# Patient Record
Sex: FEMALE | Race: WHITE | NOT HISPANIC OR LATINO | URBAN - METROPOLITAN AREA
[De-identification: names, ages, dates, MRNs, and addresses within clinical notes are randomized per-mention and may not be internally consistent; named-entity substitution may affect disease eponyms.]

---

## 2017-06-14 ENCOUNTER — OFFICE VISIT - RIVER FALLS (OUTPATIENT)
Dept: FAMILY MEDICINE | Facility: CLINIC | Age: 82
End: 2017-06-14

## 2017-07-25 ENCOUNTER — OFFICE VISIT - RIVER FALLS (OUTPATIENT)
Dept: FAMILY MEDICINE | Facility: CLINIC | Age: 82
End: 2017-07-25

## 2017-09-28 ENCOUNTER — OFFICE VISIT - RIVER FALLS (OUTPATIENT)
Dept: FAMILY MEDICINE | Facility: CLINIC | Age: 82
End: 2017-09-28

## 2017-11-29 ENCOUNTER — OFFICE VISIT - RIVER FALLS (OUTPATIENT)
Dept: FAMILY MEDICINE | Facility: CLINIC | Age: 82
End: 2017-11-29

## 2018-01-31 ENCOUNTER — OFFICE VISIT - RIVER FALLS (OUTPATIENT)
Dept: FAMILY MEDICINE | Facility: CLINIC | Age: 83
End: 2018-01-31

## 2018-03-28 ENCOUNTER — OFFICE VISIT - RIVER FALLS (OUTPATIENT)
Dept: FAMILY MEDICINE | Facility: CLINIC | Age: 83
End: 2018-03-28

## 2018-05-23 ENCOUNTER — OFFICE VISIT - RIVER FALLS (OUTPATIENT)
Dept: FAMILY MEDICINE | Facility: CLINIC | Age: 83
End: 2018-05-23

## 2018-07-31 ENCOUNTER — OFFICE VISIT - RIVER FALLS (OUTPATIENT)
Dept: FAMILY MEDICINE | Facility: CLINIC | Age: 83
End: 2018-07-31

## 2018-08-29 ENCOUNTER — OFFICE VISIT - RIVER FALLS (OUTPATIENT)
Dept: FAMILY MEDICINE | Facility: CLINIC | Age: 83
End: 2018-08-29

## 2018-09-26 ENCOUNTER — OFFICE VISIT - RIVER FALLS (OUTPATIENT)
Dept: FAMILY MEDICINE | Facility: CLINIC | Age: 83
End: 2018-09-26

## 2018-11-28 ENCOUNTER — OFFICE VISIT - RIVER FALLS (OUTPATIENT)
Dept: FAMILY MEDICINE | Facility: CLINIC | Age: 83
End: 2018-11-28

## 2019-01-01 ENCOUNTER — OFFICE VISIT - RIVER FALLS (OUTPATIENT)
Dept: FAMILY MEDICINE | Facility: CLINIC | Age: 84
End: 2019-01-01

## 2021-06-02 ENCOUNTER — RECORDS - HEALTHEAST (OUTPATIENT)
Dept: ADMINISTRATIVE | Facility: CLINIC | Age: 86
End: 2021-06-02

## 2022-02-15 NOTE — PROGRESS NOTES
NIRMALA TODD :   1922 MRN:   [id number]  [facility] Visit  S: The resident wants to decrease the frequency of her blood sugar checks.  She has otherwise been stable.   O: Blood pressure is 120/72.  Pulse is 85.  Weight is 126 and stable.  She is alert and comfortable.  Skin is normal color, temperature, and non-diaphoretic.  Lungs are clear bilaterally.  Normal respiratory rate and effort.  Heart is regular rate and rhythm without murmur, S3 or S4.  No jugular venous distension or carotid bruits.  No peripheral cyanosis or edema.  A: Diabetes.   P: Discontinue Phenergan and Imodium as well as Artificial Tears due to nonuse.  Increase Lantus to 20 units q.h.s.   D:  2017  T:  2017 Rehan Sanders MD/sq    c:  [facility]

## 2022-02-15 NOTE — PROGRESS NOTES
NIRMALA TODD  : 1922  MRN: [id number]  [facility] Visit  S: Primary medical problems are diabetes and mild dementia.  Nursing staff reports no current problems.    O: Blood pressure is 97/67.  Pulse is 103.  Weight is 131 pounds.  The patient is alert and comfortable.  Skin is normal color, temperature, and non-diaphoretic.  Lungs are clear bilaterally.  Normal respiratory rate and effort.  Heart is regular rate and rhythm without murmur, S3 or S4.  No jugular venous distension or carotid bruits.  No peripheral cyanosis or edema.  A: Dementia and diabetes, stable.  P: Continue same treatment plan.  Follow up in 60 days.   D: 2018  T: 2018  Rehan Sanders MD/chhaya  c: [facility]

## 2022-02-15 NOTE — PROGRESS NOTES
NIRMALA TODDGeena :   1922 MRN:   [id number]  [facility] Visit  S: The resident has no concerns.  Nursing staff has no concerns.    O: Blood pressure is 119/70.  Pulse is 86.  Weight is 128.     She is alert and comfortable.  Skin is normal color, temperature, and non-diaphoretic.  Lungs are clear bilaterally.  Normal respiratory rate and effort.  Heart is regular rate and rhythm without murmur, S3 or S4.  No jugular venous distension or carotid bruits.  No peripheral cyanosis or edema.  A: 1.  Mild dementia.    2.  Diabetes mellitus Type 2.   P: Continue same treatment plan.  Follow up in 60 days.   D:  2019  T:  2019 Rehan Sanders MD/sq    c:  [facility]

## 2022-02-15 NOTE — PROGRESS NOTES
NIRMALA TODD  : 1922  MRN: [id number]  [facility] Visit  S: The patient has no complaints.  Nursing staff has no concerns.  Her primary medical problems are mild dementia and diabetes mellitus type 2.    O: On examination, blood pressure is 92/61.  Pulse is 83.  Respiratory rate is 14.  The patient appears alert and comfortable.  Skin is normal color, temperature, and non-diaphoretic.  Lungs are clear.  Heart is regular rate and rhythm without murmur.  No peripheral cyanosis or edema.   A: 1. Diabetes is stable.   2. Mild dementia, stable.  P: Continue same treatment plan.  Follow up in 60 days.   D: 2019  T: 2019  Rehan Sanders MD/chhaya  c: [facility]

## 2022-02-15 NOTE — PROGRESS NOTES
NIRMALA TODD  : 1922  MRN: [id number]  [facility] Visit  S: Nursing staff has no concerns.  The patient has no complaints.  O: On examination, blood pressure is 107/60, pulse is 93, and weight is 130 pounds.  The patient appears alert and comfortable.  Skin is normal color, temperature, and non-diaphoretic.  Lungs are clear.  Heart is regular rate and rhythm without murmur.  No peripheral cyanosis or edema.   A: 1. Diabetes type 2.   2. Mild dementia.  P: Continue same orders.  Follow up in 60 days.   D: 2018  T: 2018  Rehan Sanders MD/chhaay  c: [facility]

## 2022-02-15 NOTE — PROGRESS NOTES
NIRMALA TODDGeena  :  1922  MRN:  [id number]  [facility] VISIT  S: The nursing staff is questioning as to whether the patient is comfort cares or hospice or both.  They will go ahead and communicate with the family and formally consult hospice if that is what the family prefers.  The patient herself has no complaints.      O: Blood pressure is 129/76.  Pulse is 82.  Weight is 125 and stable.  The patient is alert and comfortable.  Skin is normal in color and temperature; non-diaphoretic.  Lungs are clear bilaterally; normal respiratory rate and effort.  Heart has a regular rate and rhythm without murmur, S3 or S4.  No jugular venous distension or carotid bruits.  No peripheral cyanosis or edema.   A: 1) Diabetes.   2) Mild dementia.  P: Continue same treatment plan.  Follow up in 60 days.     D:  2017  T:  10/02/2017 Rehan Sanders MD/emanuel    c:  [facility]

## 2022-02-15 NOTE — PROGRESS NOTES
NIRMALA TODDGeena : 1922 MRN: [id number]  [facility] Visit  S: The staff requests that her blood sugars be staggered every other day.  It is noted that her appetite seems to be declining.  She has dropped 4-5 pounds in the last couple of months.  Her blood sugars were reviewed.   O: Blood pressure is 106/62.  Pulse is 98.  Weight is 126.  She is alert and comfortable.  Skin is normal color, temperature, and non-diaphoretic.  Lungs are clear bilaterally.  Normal respiratory rate and effort.  Heart is regular rate and rhythm without murmur, S3 or S4.  No jugular venous distension or carotid bruits.  No peripheral cyanosis or edema.  A: 1.  Diabetes mellitus.    2.  Mild dementia.    3.  Hard of hearing.    P: Stagger blood sugars every other day; otherwise orders remain the same.  Follow up in 60 days.   D:  2018  T:  2018 Rehan Sanders MD/sq    c:  [facility]

## 2022-02-15 NOTE — PROGRESS NOTES
NIRMALA TODD. :   1922 MRN:   [id number]  [facility] Visit  S: The resident s problem list includes diabetes mellitus Type 2, coronary atherosclerosis, hypertension, asthma, gastroesophageal reflux disease, chronic renal failure, osteoporosis, history of colon cancer, major depression, hyperlipidemia, and vascular dementia.  She denies any current medical problems.  Nursing staff has no current concerns.   O: Blood pressure is 111/64.  Pulse is 88.  Weight is 128.     She appears alert and comfortable.  Skin is normal color, temperature, and non-diaphoretic.  Lungs are clear.  Heart is regular rate and rhythm without murmur.  No peripheral cyanosis or edema.  A: Problem list as above.   P: Continue same treatment plan.  Follow up in 60 days.   D:  2019  T:  2019 Rehan Sanders MD/sq    c:  [facility]

## 2022-02-15 NOTE — PROGRESS NOTES
NIRMALA TODDGeena :   1922 MRN:   [id number]  [facility] Visit  S: The nursing staff continues to note gradual decline.  She has episodes of hypoglycemia so we are going to need to back off on her insulin doses.    O: Blood pressure is 115/60.  Pulse is 81.  Weight is 126.  She appears alert and comfortable.  She is mildly confused. Lungs are clear bilaterally.  Heart is regular rate and rhythm without murmur.  No peripheral cyanosis or edema.  A: Type 2 diabetes mellitus.   P: We will discontinue her NovoLog insulin.  Remaining orders the same.  Follow up in 60 days.   D:  2019  T:  2019 Rehan Sanders MD/sq    c:  [facility]

## 2022-02-15 NOTE — PROGRESS NOTES
NIRMALA TODD :  1922 MRN:  [id number]  [facility] Visit  S: The patient has a nonproductive cough at night.  She has not been febrile.  No shortness of breath.  The cough is nonproductive.  She also has a skin lesion on her left cheek.    O: Blood pressure is 122/68.  Pulse is 92.  Weight is 133.  Patient appears alert and comfortable.  Skin normal color.  Temperature non-diaphoretic.  Does not cough during the encounter.  Lungs are clear bilaterally.  Heart:  Regular rate and rhythm without murmur.  No peripheral cyanosis, edema.  She has a 1 cm diameter lesion on her left facial cheek which appears to be a basal cell epithelioma.    A: 1)  Nonspecific cough.  She does not seem to have an upper respiratory infection.  May represent some acid reflux versus allergies.   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  P: Clinic appointment will be scheduled in the near future to excise her skin lesion.  We will recheck her cough at that time.     D: 2018  T: 2018 Rehan Sanders MD/    c:  [facility]

## 2022-02-15 NOTE — PROGRESS NOTES
NIRMALA TODD. :  1922 MRN:  [id number]  [facility] Visit  S: The resident has no complaints.  Nursing staff has no concerns.    O: Vital signs reviewed and within acceptable limits.  Lungs are clear.  Heart is regular rate and rhythm without murmur.  No peripheral cyanosis or edema.  No pedal skin lesions.    A: 1.  Diabetes mellitus Type 2.    2.  Mild dementia.   P: Continue same treatment plan.  Follow up in 60 days.   D:  2019  T:  10/02/2019 Rehan Sanders MD/sq    c:  [facility]

## 2022-02-15 NOTE — PROGRESS NOTES
NIRMALA TODD  : 1922  MRN: [id number]  [facility] Visit  S: The nursing staff has no concerns.  The patient vocalizes no concerns.    O: Blood pressure is 104/51.  Pulse is 94.  Weight is 126 pounds.  Blood sugars are reviewed.  Lungs are clear.  Heart is regular rate and rhythm without murmur.  No peripheral cyanosis or edema.   A: Diabetes mellitus, controlled.  P: Continue same treatment plan.  Follow up in 60 days.   D: 2018  T: 2018  Rehan Sanders MD/chhaya  c: [facility]

## 2022-02-15 NOTE — PROGRESS NOTES
NIRMALA TODD  :  1922 MRN:  [id number]  [facility] Visit  S: Patient has no concerns.  Nursing staff has no concerns.    O: Blood pressure 126/52.  Pulse 81.  Weight 122.    The patient is alert and comfortable.  Skin is normal color, temperature, and non-diaphoretic.  Lungs are clear bilaterally.  Normal respiratory rate and effort.  Heart is regular rate and rhythm without murmur, S3 or S4.  No jugular venous distension or carotid bruits.  No peripheral cyanosis or edema.   A: 1)  Mild dementia.   2)  Diabetes  P: Continue same treatment plan.  Follow-up in 60 days.   D:   2017  T:   2017 Rehan Sanders MD/    c:  [facility]

## 2022-02-15 NOTE — PROGRESS NOTES
JASNATALIIA NIRMALA ACUNAGeena  :  1922  MRN:  [id number]  [facility] VISIT   S: This patient has no complaints.  Nursing staff has no concerns.      O: Blood pressure is 118/75.  Pulse is 89.  Weight is 128.    The patient appears alert and comfortable.   Skin is normal in color and temperature; non-diaphoretic.   Lungs are clear.   Heart has a regular rate and rhythm without murmur.   No peripheral cyanosis or edema.    A: 1) Diabetes mellitus type 2.   2) Mild dementia.   P: No change in treatment plan.  Follow up in sixty days.   D:  2019  T:  2019 Rehan Sanders MD/emanuel    c:  [facility]

## 2022-02-16 NOTE — PROGRESS NOTES
NIRMALA TODDGeena :   1922 MRN:   [id number]  [facility] Visit  S: The resident s blood sugars have been reviewed.  She is having some low blood sugars that are probably due to eating less.  She denies any current medical problems.    O: Blood pressure is 120/62.  Pulse is 100.  Weight is 127.  She appears alert and comfortable.  Skin is normal color, temperature, and non-diaphoretic.  Lungs are clear bilaterally.  Heart is regular rate and rhythm without murmur.  No peripheral cyanosis or edema.  A: Diabetes mellitus Type 2, insulin dependent.   P: I am decreasing her NovoLog to 5 units with each meal instead of 6 units.  The Lantus dose will remain the same.  Continue to follow her blood sugars.    Follow up in 60 days.    D:  2018  T:  2018 Rehan Sanders MD/nino    c:  [facility]

## 2022-02-16 NOTE — PROGRESS NOTES
NIRMALA TODD :  1922 MRN:  [id number]  [facility] Visit  S: Nursing staff notes the patient s blood sugars have been more elevated especially mid-day.    O: Blood pressure 132/76.  Pulse 94.  Weight 131.  Her blood sugars are reviewed.  The patient is alert and comfortable.  Skin is normal color, temperature, and non-diaphoretic.  Lungs are clear bilaterally.  Normal respiratory rate and effort.  Heart is regular rate and rhythm without murmur, S3 or S4.  No jugular venous distension or carotid bruits.  No peripheral cyanosis or edema.   A: Diabetes.  P: Increase her Lantus insulin to 22 Units q.h.s.   D:  2018  T:  2018 Rehan Sanders MD/    c:  [facility]